# Patient Record
Sex: FEMALE | URBAN - METROPOLITAN AREA
[De-identification: names, ages, dates, MRNs, and addresses within clinical notes are randomized per-mention and may not be internally consistent; named-entity substitution may affect disease eponyms.]

---

## 2023-03-23 ENCOUNTER — LAB REQUISITION (OUTPATIENT)
Dept: LAB | Age: 23
End: 2023-03-23

## 2023-03-23 DIAGNOSIS — Z01.20 ENCOUNTER FOR DENTAL EXAMINATION AND CLEANING WITHOUT ABNORMAL FINDINGS: ICD-10-CM

## 2023-03-23 PROCEDURE — PSEU9049 QUANTIFERON TB PLUS: Performed by: CLINICAL MEDICAL LABORATORY

## 2023-03-23 PROCEDURE — 86480 TB TEST CELL IMMUN MEASURE: CPT | Performed by: CLINICAL MEDICAL LABORATORY

## 2023-03-25 LAB
GAMMA INTERFERON BACKGROUND BLD IA-ACNC: 0.04 IU/ML
M TB IFN-G BLD-IMP: NEGATIVE
M TB IFN-G CD4+ BCKGRND COR BLD-ACNC: 0 IU/ML
M TB IFN-G CD4+CD8+ BCKGRND COR BLD-ACNC: 0.01 IU/ML
MITOGEN IGNF BCKGRD COR BLD-ACNC: >10 IU/ML

## 2023-05-30 NOTE — PATIENT INSTRUCTIONS
These are pack for your screenings. I had a similar influence. Follow-up in 2 to 3 weeks time, sooner if problems.

## 2023-06-20 NOTE — PATIENT INSTRUCTIONS
You have a severe left septal deviation. Continue the Zyrtec, Singulair, and Flonase for your allergic symptoms. You can consider a septoplasty.   Follow-up 6 to 12 months time, sooner if.

## 2023-12-26 NOTE — PATIENT INSTRUCTIONS
A refill of your Zyrtec was sent to the pharmacy. Continue Zyrtec, Singulair, and Flonase. You do have a severe left septal deviation. Should you decide a septoplasty can be considered. Follow-up in 6 months time, sooner if problems.

## 2023-12-26 NOTE — PROGRESS NOTES
Andrew Cadena is a 21year old female. Chief Complaint   Patient presents with    Follow - Up     Patient is here due to nasal turbinate hypertrophy follow up. Reports having questions regarding surgery. HPI:   Patient here for follow-up on nasal obstruction is taking Zyrtec, Singulair, and Flonase. Current Outpatient Medications   Medication Sig Dispense Refill    metFORMIN  MG Oral Tablet 24 Hr Take 1 tablet (500 mg total) by mouth daily with breakfast.      sodium chloride 0.65 % Nasal Solution 1 spray by Nasal route. cetirizine 10 MG Oral Tab Take 1 tablet (10 mg total) by mouth daily. 90 tablet 3    albuterol 108 (90 Base) MCG/ACT Inhalation Aero Soln Inhale 2 puffs into the lungs every 6 (six) hours as needed. montelukast 10 MG Oral Tab Take 1 tablet (10 mg total) by mouth nightly. 30 tablet 5    fluticasone propionate 50 MCG/ACT Nasal Suspension 2 sprays by Nasal route daily. 16 g 5      Past Medical History:   Diagnosis Date    Asthma       Social History:  Social History     Socioeconomic History    Marital status: Single   Tobacco Use    Smoking status: Never    Smokeless tobacco: Never   Vaping Use    Vaping Use: Never used   Substance and Sexual Activity    Alcohol use: Yes     Comment: occasionally    Drug use: Never        REVIEW OF SYSTEMS:   GENERAL HEALTH: feels well otherwise  GENERAL : denies fever, chills, sweats, weight loss, weight gain  SKIN: denies any unusual skin lesions or rashes  RESPIRATORY: denies shortness of breath with exertion  NEURO: denies headaches    EXAM:   There were no vitals taken for this visit.   System Details   Skin Inspection - Normal.   Constitutional Overall appearance - Normal.   Head/Face Facial features - Normal. Eyebrows - Normal. Skull - Normal.   Eyes Conjunctiva - Right: Normal, Left: Normal. Pupil - Right: Normal, Left: Normal.    Ears Inspection - Right: Normal, Left: Normal.   Canal - Right: Normal, Left: Normal.   TM - Right: Normal, Left: Normal.   Nasal External nose - Normal.   Nasal septum -severe left septal deviation  Turbinates -turbinate congestion. No purulence or polyps seen   Oral/Oropharynx Lips - Normal, Tonsils - Normal, Tongue - Normal    Neck Exam Inspection - Normal. Palpation - Normal. Parotid gland - Normal. Thyroid gland - Normal.   Lymph Detail Submental. Submandibular. Anterior cervical. Posterior cervical. Supraclavicular all without enlargement   Psychiatric Orientation - Oriented to time, place, person & situation. Appropriate mood and affect. Neurological Memory - Normal. Cranial nerves - Cranial nerves II through XII grossly intact. ASSESSMENT AND PLAN:   1. Nasal turbinate hypertrophy  Continue Zyrtec, Singulair, and Flonase. Can consider septoplasty as has a very severe left septal deviation. 2. Allergic rhinitis with postnasal drip  Follow-up in 6 months time, sooner if problems. 3. Nasal septal deviation  Patient would like to wait on septoplasty for now. The patient indicates understanding of these issues and agrees to the plan.       Rajwinder Avila MD  12/26/2023  12:36 PM

## 2024-07-02 ENCOUNTER — LAB ENCOUNTER (OUTPATIENT)
Dept: LAB | Age: 24
End: 2024-07-02
Attending: SPECIALIST
Payer: COMMERCIAL

## 2024-07-02 ENCOUNTER — OFFICE VISIT (OUTPATIENT)
Dept: OTOLARYNGOLOGY | Facility: CLINIC | Age: 24
End: 2024-07-02

## 2024-07-02 VITALS — HEIGHT: 61 IN | BODY MASS INDEX: 32.1 KG/M2 | WEIGHT: 170 LBS

## 2024-07-02 DIAGNOSIS — J34.3 NASAL TURBINATE HYPERTROPHY: ICD-10-CM

## 2024-07-02 DIAGNOSIS — J34.2 NASAL SEPTAL DEVIATION: ICD-10-CM

## 2024-07-02 DIAGNOSIS — R09.82 ALLERGIC RHINITIS WITH POSTNASAL DRIP: ICD-10-CM

## 2024-07-02 DIAGNOSIS — J30.9 ALLERGIC RHINITIS WITH POSTNASAL DRIP: Primary | ICD-10-CM

## 2024-07-02 DIAGNOSIS — J30.9 ALLERGIC RHINITIS WITH POSTNASAL DRIP: ICD-10-CM

## 2024-07-02 DIAGNOSIS — R09.82 ALLERGIC RHINITIS WITH POSTNASAL DRIP: Primary | ICD-10-CM

## 2024-07-02 PROCEDURE — 99213 OFFICE O/P EST LOW 20 MIN: CPT | Performed by: SPECIALIST

## 2024-07-02 PROCEDURE — 3008F BODY MASS INDEX DOCD: CPT | Performed by: SPECIALIST

## 2024-07-02 PROCEDURE — 86003 ALLG SPEC IGE CRUDE XTRC EA: CPT

## 2024-07-02 PROCEDURE — 36415 COLL VENOUS BLD VENIPUNCTURE: CPT

## 2024-07-02 PROCEDURE — 82785 ASSAY OF IGE: CPT

## 2024-07-02 RX ORDER — FOLIC ACID 1 MG/1
1 TABLET ORAL WEEKLY
COMMUNITY
Start: 2024-02-14

## 2024-07-02 RX ORDER — ALPRAZOLAM 0.25 MG/1
0.25 TABLET ORAL 2 TIMES DAILY
COMMUNITY
Start: 2024-05-16

## 2024-07-02 NOTE — PATIENT INSTRUCTIONS
You had severe nasal congestion.  You were sent for allergy testing.  In the meantime, continue the Zyrtec, Singulair, and Flonase.  You do have a left septal deviation.  A septoplasty and Coblation of the turbinates can still be considered.

## 2024-07-02 NOTE — PROGRESS NOTES
Christiana Baumann is a 24 year old female.   Chief Complaint   Patient presents with    Follow - Up     nasal turbinate hypertrophy     HPI:   Patient here for very severe nasal congestion and postnasal drip.  She feels that she has seasonal allergies has never been tested.    Current Outpatient Medications   Medication Sig Dispense Refill    ALPRAZolam 0.25 MG Oral Tab Take 1 tablet (0.25 mg total) by mouth 2 (two) times daily.      Cholecalciferol (VITAMIN D3) 1.25 MG (65527 UT) Oral Cap Take 1 capsule by mouth once a week.      metFORMIN  MG Oral Tablet 24 Hr Take 1 tablet (500 mg total) by mouth daily with breakfast.      sodium chloride 0.65 % Nasal Solution 1 spray by Nasal route.      cetirizine 10 MG Oral Tab Take 1 tablet (10 mg total) by mouth daily. 90 tablet 3    albuterol 108 (90 Base) MCG/ACT Inhalation Aero Soln Inhale 2 puffs into the lungs every 6 (six) hours as needed.      montelukast 10 MG Oral Tab Take 1 tablet (10 mg total) by mouth nightly. 30 tablet 5    fluticasone propionate 50 MCG/ACT Nasal Suspension 2 sprays by Nasal route daily. 16 g 5      Past Medical History:    Asthma (HCC)      Social History:  Social History     Socioeconomic History    Marital status: Single   Tobacco Use    Smoking status: Never    Smokeless tobacco: Never   Vaping Use    Vaping status: Never Used   Substance and Sexual Activity    Alcohol use: Yes     Comment: occasionally    Drug use: Never     Social Determinants of Health     Financial Resource Strain: Patient Declined (3/2/2024)    Received [facility]    Overall Financial Resource Strain (CARDIA)     Difficulty of Paying Living Expenses: Patient declined   Food Insecurity: No Food Insecurity (6/26/2024)    Received from [facility]    Food Insecurity     Within the past 30 days, I worried whether my food would run out before I got money to buy more. / En los últimos 30 días, me preocupó que la comida se podía  acabar antes de tener dinero para compr...: Never true / Nunca     Within the past 30 days, the food that I bought just didn't last, and I didn't have money to get more. / En los últimos 30 días, La comida que compré no rindió lo suficiente, y no tenía dinero para...: Never true / Nunca   Housing Stability: Patient Declined (3/2/2024)    Received from [facility]    Housing Stability Vital Sign     Unable to Pay for Housing in the Last Year: Patient declined     Unstable Housing in the Last Year: Patient declined        REVIEW OF SYSTEMS:   GENERAL HEALTH: feels well otherwise  GENERAL : denies fever, chills, sweats, weight loss, weight gain  SKIN: denies any unusual skin lesions or rashes  RESPIRATORY: denies shortness of breath with exertion  NEURO: denies headaches    EXAM:   Ht 5' 1\" (1.549 m)   Wt 170 lb (77.1 kg)   BMI 32.12 kg/m²   System Details   Skin Inspection - Normal.   Constitutional Overall appearance - Normal.   Head/Face Facial features - Normal. Eyebrows - Normal. Skull - Normal.   Eyes Conjunctiva - Right: Normal, Left: Normal. Pupil - Right: Normal, Left: Normal.    Ears Inspection - Right: Normal, Left: Normal.   Canal - Right: Normal, Left: Normal.   TM - Right: Normal, Left: Normal.   Nasal External nose - Normal.   Nasal septum -severe left septal deviation  Turbinates -severe turbinate congestion and mucoid postnasal drainage   Oral/Oropharynx Lips - Normal, Tonsils - Normal, Tongue - Normal    Neck Exam Inspection - Normal. Palpation - Normal. Parotid gland - Normal. Thyroid gland - Normal.   Lymph Detail Submental. Submandibular. Anterior cervical. Posterior cervical. Supraclavicular all without enlargement   Psychiatric Orientation - Oriented to time, place, person & situation. Appropriate mood and affect.   Neurological Memory - Normal. Cranial nerves - Cranial nerves II through XII grossly intact.     ASSESSMENT AND PLAN:   1. Allergic rhinitis with postnasal  drip  Sent for allergy testing.  In the meantime, continue Zyrtec, Singulair, and Flonase.  - Allergens, Zone 8; Future    2.  Deviated nasal septum  To the left    3.  Turbinate congestion  Consistent with above diagnosis of allergic rhinitis.      The patient indicates understanding of these issues and agrees to the plan.      Adalgisa Hyatt MD  7/2/2024  8:51 AM

## 2024-07-05 ENCOUNTER — PATIENT MESSAGE (OUTPATIENT)
Dept: OTOLARYNGOLOGY | Facility: CLINIC | Age: 24
End: 2024-07-05

## 2024-07-05 LAB
A ALTERNATA IGE QN: <0.1 KUA/L (ref ?–0.1)
A FUMIGATUS IGE QN: <0.1 KUA/L (ref ?–0.1)
AMER SYCAMORE IGE QN: <0.1 KUA/L (ref ?–0.1)
BERMUDA GRASS IGE QN: <0.1 KUA/L (ref ?–0.1)
BOXELDER IGE QN: <0.1 KUA/L (ref ?–0.1)
C HERBARUM IGE QN: <0.1 KUA/L (ref ?–0.1)
CALIF WALNUT IGE QN: <0.1 KUA/L (ref ?–0.1)
CAT DANDER IGE QN: 0.47 KUA/L (ref ?–0.1)
CMN PIGWEED IGE QN: <0.1 KUA/L (ref ?–0.1)
COMMON RAGWEED IGE QN: <0.1 KUA/L (ref ?–0.1)
COTTONWOOD IGE QN: 0.18 KUA/L (ref ?–0.1)
D FARINAE IGE QN: 3.4 KUA/L (ref ?–0.1)
D PTERONYSS IGE QN: 4.63 KUA/L (ref ?–0.1)
DOG DANDER IGE QN: 11.7 KUA/L (ref ?–0.1)
IGE SERPL-ACNC: 426 KU/L (ref 2–214)
M RACEMOSUS IGE QN: <0.1 KUA/L (ref ?–0.1)
MARSH ELDER IGE QN: <0.1 KUA/L (ref ?–0.1)
MOUSE EPITH IGE QN: 2.94 KUA/L (ref ?–0.1)
MT JUNIPER IGE QN: <0.1 KUA/L (ref ?–0.1)
P NOTATUM IGE QN: <0.1 KUA/L (ref ?–0.1)
PECAN/HICK TREE IGE QN: <0.1 KUA/L (ref ?–0.1)
ROACH IGE QN: <0.1 KUA/L (ref ?–0.1)
SALTWORT IGE QN: <0.1 KUA/L (ref ?–0.1)
SILVER BIRCH IGE QN: <0.1 KUA/L (ref ?–0.1)
TIMOTHY IGE QN: <0.1 KUA/L (ref ?–0.1)
WHITE ASH IGE QN: <0.1 KUA/L (ref ?–0.1)
WHITE ELM IGE QN: <0.1 KUA/L (ref ?–0.1)
WHITE MULBERRY IGE QN: <0.1 KUA/L (ref ?–0.1)
WHITE OAK IGE QN: <0.1 KUA/L (ref ?–0.1)

## 2024-07-05 NOTE — PROGRESS NOTES
You are extremely allergic to dogs, somewhat allergic to cats and also highly allergic to dust and dust mites.  Unfortunately the pet and dust are year round allergies.  You have a mild allergy to the [place] tree.  This will affect you for only about 2 weeks out of the year.  Do you have pets?  Would need to be on year round allergy medications versus immunotherapy.

## 2024-07-05 NOTE — TELEPHONE ENCOUNTER
It is the dog dander.  So their skin.  Probably you are allergic to them.  Can you prevent them from sleeping in your room?  This would help at the very least.  Message sent to the patient.

## 2024-07-05 NOTE — TELEPHONE ENCOUNTER
From: Christiana Oneill  To: Adalgisa Hyatt  Sent: 7/5/2024 3:32 PM CDT  Subject: Lab results    I did review your comment from my allergy test i do have dogs but they are hypoallergenic is that fine and i also have a guinea pig

## 2024-07-08 NOTE — TELEPHONE ENCOUNTER
It is best to avoid sleeping with him if possible as you are highly allergic.  I would try to get the guinea pig out of your bedroom as well.  Message left for patient.

## 2024-09-09 RX ORDER — CETIRIZINE HYDROCHLORIDE 10 MG/1
10 TABLET ORAL DAILY
Qty: 90 TABLET | Refills: 0 | Status: SHIPPED | OUTPATIENT
Start: 2024-09-09

## 2024-11-05 ENCOUNTER — OFFICE VISIT (OUTPATIENT)
Dept: OTOLARYNGOLOGY | Facility: CLINIC | Age: 24
End: 2024-11-05
Payer: COMMERCIAL

## 2024-11-05 VITALS — HEIGHT: 61 IN | WEIGHT: 170 LBS | BODY MASS INDEX: 32.1 KG/M2

## 2024-11-05 DIAGNOSIS — J30.81 ALLERGIC RHINITIS DUE TO ANIMAL (CAT) (DOG) HAIR AND DANDER: ICD-10-CM

## 2024-11-05 DIAGNOSIS — J34.2 NASAL SEPTAL DEVIATION: Primary | ICD-10-CM

## 2024-11-05 DIAGNOSIS — J30.1 ALLERGIC RHINITIS DUE TO TREE POLLEN: ICD-10-CM

## 2024-11-05 DIAGNOSIS — J30.89 ALLERGIC RHINITIS DUE TO DUST: ICD-10-CM

## 2024-11-05 DIAGNOSIS — J34.3 NASAL TURBINATE HYPERTROPHY: ICD-10-CM

## 2024-11-05 PROCEDURE — 99213 OFFICE O/P EST LOW 20 MIN: CPT | Performed by: SPECIALIST

## 2024-11-05 NOTE — PROGRESS NOTES
Christiana Oneill is a 24 year old female.   Chief Complaint   Patient presents with    Follow - Up     Deviated septum     HPI:   Here to discuss her nasal obstruction had allergy testing done where she had severe allergies.  Worse to dust, dog, cat, and [place] tree.  She is around 3 dogs.    Current Outpatient Medications   Medication Sig Dispense Refill    cetirizine 10 MG Oral Tab Take 1 tablet (10 mg total) by mouth daily. 90 tablet 0    ALPRAZolam 0.25 MG Oral Tab Take 1 tablet (0.25 mg total) by mouth 2 (two) times daily.      Cholecalciferol (VITAMIN D3) 1.25 MG (34115 UT) Oral Cap Take 1 capsule by mouth once a week.      metFORMIN  MG Oral Tablet 24 Hr Take 1 tablet (500 mg total) by mouth daily with breakfast.      sodium chloride 0.65 % Nasal Solution 1 spray by Nasal route.      albuterol 108 (90 Base) MCG/ACT Inhalation Aero Soln Inhale 2 puffs into the lungs every 6 (six) hours as needed.      montelukast 10 MG Oral Tab Take 1 tablet (10 mg total) by mouth nightly. 30 tablet 5    fluticasone propionate 50 MCG/ACT Nasal Suspension 2 sprays by Nasal route daily. 16 g 5      Past Medical History:    Asthma (HCC)      Social History:  Social History     Socioeconomic History    Marital status: Single   Tobacco Use    Smoking status: Never    Smokeless tobacco: Never   Vaping Use    Vaping status: Never Used   Substance and Sexual Activity    Alcohol use: Yes     Comment: occasionally    Drug use: Never     Social Drivers of Health     Financial Resource Strain: Patient Declined (3/2/2024)    [facility] [employer] [facility]    Overall Financial Resource Strain (CARDIA)     Difficulty of Paying Living Expenses: Patient declined   Food Insecurity: Unknown (10/9/2024)    Received from [facility]    Food Insecurity     Within the past 30 days, I worried whether my food would run out before I got money to buy more. / En los últimos 30 días, me preocupó que la  comida se podía acabar antes de tener dinero para compr...: Refused/Don't know / Rechazó/No sabe     Within the past 30 days, the food that I bought just didn't last, and I didn't have money to get more. / En los últimos 30 días, La comida que compré no rindió lo suficiente, y no tenía dinero para...: Refused/Don't know / Rechazó/No sabe   Housing Stability: Patient Declined (3/2/2024)    Received from [facility]    Housing Stability Vital Sign     Unable to Pay for Housing in the Last Year: Patient declined     Unstable Housing in the Last Year: Patient declined        REVIEW OF SYSTEMS:   GENERAL HEALTH: feels well otherwise  GENERAL : denies fever, chills, sweats, weight loss, weight gain  SKIN: denies any unusual skin lesions or rashes  RESPIRATORY: denies shortness of breath with exertion  NEURO: denies headaches    EXAM:   Ht 5' 1\" (1.549 m)   Wt 170 lb (77.1 kg)   BMI 32.12 kg/m²   System Details   Skin Inspection - Normal.   Constitutional Overall appearance - Normal.   Head/Face Facial features - Normal. Eyebrows - Normal. Skull - Normal.   Eyes Conjunctiva - Right: Normal, Left: Normal. Pupil - Right: Normal, Left: Normal.    Ears Inspection - Right: Normal, Left: Normal.   Canal - Right: Normal, Left: Normal.   TM - Right: Normal, Left: Normal.   Nasal External nose - Normal.   Nasal septum -left septal deviation moderate to severe  Turbinates -pale and very congested turbinates   Oral/Oropharynx Lips - Normal, Tonsils - Normal, Tongue - Normal    Neck Exam Inspection - Normal. Palpation - Normal. Parotid gland - Normal. Thyroid gland - Normal.   Lymph Detail Submental. Submandibular. Anterior cervical. Posterior cervical. Supraclavicular all without enlargement   Psychiatric Orientation - Oriented to time, place, person & situation. Appropriate mood and affect.   Neurological Memory - Normal. Cranial nerves - Cranial nerves II through XII grossly intact.     ASSESSMENT AND PLAN:    1. Nasal septal deviation  To the left    2. Nasal turbinate hypertrophy  Pale and congested turbinates.  Patient to continue her Singulair, Flonase, and Zyrtec.  Patient can consider a septoplasty and Coblation of bilateral inferior turbinates    3. Allergic rhinitis due to dust  Results reviewed with patient.  Patient can consider immunotherapy.    4. Allergic rhinitis due to animal (cat) (dog) hair and dander  Results reviewed with patient I have recommended her to keep the dogs out of her bedroom if possible    5. Allergic rhinitis due to tree pollen  Mild allergy to [place] tree.      The patient indicates understanding of these issues and agrees to the plan.      Adalgisa Hyatt MD  11/5/2024  3:55 PM

## 2024-11-05 NOTE — PATIENT INSTRUCTIONS
You have very severe allergies.  These are worse to dog and cat dander as well as dust mite.  These are all year-round allergens.  You do have a mild allergy also to [place] tree.  Continue the Flonase, Singulair, and Zyrtec.  Consider septoplasty and Coblation of the bilateral inferior turbinates for your left septal deviation and turbinate congestion.  I also think you would really benefit from immunotherapy.  Please call me if you decide to have the surgery done.

## 2024-12-02 RX ORDER — MONTELUKAST SODIUM 10 MG/1
10 TABLET ORAL NIGHTLY
Qty: 30 TABLET | Refills: 2 | Status: SHIPPED | OUTPATIENT
Start: 2024-12-02